# Patient Record
Sex: MALE | Race: WHITE
[De-identification: names, ages, dates, MRNs, and addresses within clinical notes are randomized per-mention and may not be internally consistent; named-entity substitution may affect disease eponyms.]

---

## 2021-01-05 ENCOUNTER — HOSPITAL ENCOUNTER (EMERGENCY)
Dept: HOSPITAL 62 - ER | Age: 36
Discharge: HOME | End: 2021-01-05
Payer: COMMERCIAL

## 2021-01-05 VITALS — DIASTOLIC BLOOD PRESSURE: 75 MMHG | SYSTOLIC BLOOD PRESSURE: 119 MMHG

## 2021-01-05 DIAGNOSIS — M54.42: Primary | ICD-10-CM

## 2021-01-05 PROCEDURE — 96372 THER/PROPH/DIAG INJ SC/IM: CPT

## 2021-01-05 PROCEDURE — 99284 EMERGENCY DEPT VISIT MOD MDM: CPT

## 2021-01-05 PROCEDURE — 72110 X-RAY EXAM L-2 SPINE 4/>VWS: CPT

## 2021-01-05 NOTE — ER DOCUMENT REPORT
HPI





- HPI


Time Seen by Provider: 01/05/21 10:31


Pain Level: 3


Context: 


Patient is a 35-year-old male who comes to the emergency department for chief 

complaint of lower back pain.  He states he started having this pain about 3 

days ago, he states this is worsened to the point that he has a a lot of trouble

with position changes, he has to roll out of bed and maneuver for any position 

change.  He also has a lot of pain with walking with shooting pains down the 

back of his left leg.  Pain is worse in the left lower back.  He denies recent 

injury although he states he has had trouble with his back for a while, he has 

been taking Robaxin without relief or improvement, he has seen a chiropractor in

the past.  He denies any specific severe trauma.  He is formally active duty and

is now a teacher.  He denies any other medications or medical history.  He 

denies recreational drugs, fever/chills, numbness, incontinence, vomiting, or 

other areas of pain.  He is urinating without difficulty.





Past Medical History





- General


Information source: Patient





- Social History


Smoking Status: Never Smoker


Chew tobacco use (# tins/day): No


Frequency of alcohol use: Social


Drug Abuse: None


Lives with: Family


Family History: Reviewed & Not Pertinent





- Immunizations


Immunizations up to date: Yes


Hx Diphtheria, Pertussis, Tetanus Vaccination: Yes





Vertical Provider Document





- CONSTITUTIONAL


General Appearance: WD/WN, Mild Distress - Patient in obvious distress with 

position changes but otherwise well-appearing





- HEENT


HEENT: Atraumatic, Normal ENT Exam, Normocephalic





- NECK


Neck: Normal Inspection





- RESPIRATORY


Respiratory: Breath Sounds Normal, No Respiratory Distress





- CARDIOVASCULAR


Cardiovascular: Regular Rate, Regular Rhythm





- GI/ABDOMEN


Gastrointestinal: Abdomen Soft, Abdomen Non-Tender.  negative: Abdomen Tender





- BACK


Back: negative: Normal Inspection - There is what appears to be muscle spasm in 

the left paralumbar musculature.  Positive straight leg raise on the left.  No 

midline tenderness, no saddle anesthesia, no signs of trauma. Normal upper and 

lower extremity range of motion, normal strength, normal distal neurovascular 

exam.





- MUSCULOSKELETAL/EXTREMETIES


Musculoskeletal/Extremeties: MAEW, FROM, Non-Tender





- NEURO


Level of Consciousness: Awake, Alert, Appropriate


Motor/Sensory: No Motor Deficit, No Sensory Deficit





- DERM


Integumentary: Warm, Dry, No Rash





Course





- Re-evaluation


Re-evalutation: 


Patient is miserable, has obvious difficulty with position changes, very 

positive left-sided straight leg raise, but no midline tenderness or red flags. 

No neurological deficits, fever, history of IV drug abuse.  X-rays without any 

concerning findings.  Patient has had this in the past.  I suspect a herniated 

disc based on his physical exam and symptoms.  I discussed work-up, patient is 

improved after Toradol but still having a lot of back spasms.  Patient will be 

placed on therapy, [facility], I discussed 

recommendations and return precautions in detail.  Patient is still able to 

ambulate.  Patient states understanding and agreement.  Stable and well-

appearing at time of discharge.








- Vital Signs


Vital signs: 


                                        











Temp Pulse Resp BP Pulse Ox


 


 97.6 F   83   14   119/75   98 


 


 01/05/21 09:47  01/05/21 09:47  01/05/21 09:47  01/05/21 09:47  01/05/21 09:47














- Laboratory Results


Critical Laboratory Results Reviewed: No Critical Results





- Radiology Results


Critical Radiology Results Reviewed: No Critical Results





Discharge





- Discharge


Clinical Impression: 


Lower back pain


Qualifiers:


 Chronicity: acute Back pain laterality: left Sciatica presence: with sciatica 

Sciatica laterality: sciatica of left side Qualified Code(s): M54.42 - Lumbago 

with sciatica, left side





Condition: Stable


Disposition: HOME, SELF-CARE


Additional Instructions: 


Your x-ray is normal.  Your exam is consistent with a herniated disc causing 

impingement of the sciatic nerve resulting in your pain and muscle spasms.  Take

the prednisone as prescribed to completion, take the Valium as prescribed, apply

heat to the area, avoid any lifting or twisting, and rest.  If symptoms continue

follow-up with primary care for additional management including possible 

physical therapy and MRI.





Return if you worsen including developing numbness, inability to control your 

bowel or bladder, fever, or any other concerning or worsening symptoms.


Prescriptions: 


Prednisone [Deltasone 10 mg Tablet] 10 mg PO ASDIR PRN #21 tablet


 PRN Reason: 


Diazepam [Valium 5 mg Tablet] 1 - 2 tab PO TID PRN #15 tablet


 PRN Reason: 


Forms:  Return to Work


Referrals: 


[facility] [[facility]] - Follow up as needed

## 2021-01-05 NOTE — RADIOLOGY REPORT (SQ)
EXAM DESCRIPTION:  L SPINE WHOLE



IMAGES COMPLETED DATE/TIME:  1/5/2021 11:38 am



REASON FOR STUDY:  back pain, previous injury



COMPARISON:  None.



NUMBER OF VIEWS:  Five views including obliques.



TECHNIQUE:  AP, lateral, oblique, and sacral radiographic images acquired of the lumbar spine.



LIMITATIONS:  None.



FINDINGS:  MINERALIZATION: Normal.

SEGMENTATION: Normal.  No transitional anatomy.

ALIGNMENT: Normal.

VERTEBRAE: Maintained height.  No fracture or worrisome bone lesion.

DISCS: Preserved height.  No significant osteophytes or end plate irregularity.

POSTERIOR ELEMENTS: Pedicles and facets are intact.  No pars defect or posterior arch defects.

HARDWARE: None in the spine.

PARASPINAL SOFT TISSUES: Normal.

PELVIS: Intact as visualized. No fractures or worrisome bone lesions. SI joints intact.

OTHER: No other significant finding.



IMPRESSION:  NORMAL 5 VIEW LUMBAR SPINE.



TECHNICAL DOCUMENTATION:  JOB ID:  [id number]

 2011 [employer]- All Rights Reserved



Reading location - IP/workstation name: [email or web address]